# Patient Record
(demographics unavailable — no encounter records)

---

## 2024-11-05 NOTE — HISTORY OF PRESENT ILLNESS
[FreeTextEntry1] : The patient is an 84-year-old left-handed woman who was diagnosed with Parkinson's disease by Dr. Otoniel Gacria in 2019 or thereabouts.  She comes accompanied by her daughter. Her initial symptoms were tremors of both hands.  After being diagnosed with essential tremor this was later changed to Parkinson disease and she was started on levodopa.  She now takes 25/100, 1 tablet 4 times a day (8 AM, 12 PM, 4 PM, and 8 PM).  She has no clear fluctuations.  Her voice is lower but her facial expression is good.  She has rare drooling and no dysphagia.  She does have trouble turning over in bed.  Her handwriting is poor.  She is independent in ADLs but slower.  She has been using a walker since early 2023.  She does have freezing of gait especially in small spaces.  She has fallen but not recently.  All the falls have been backwards.  She still does have tremor, worse on the right.  Her memory is poor.  She does have depression for which she is on Lexapro 10 mg, which is not sufficiently helping.  She has no hallucinations.  She has no known history of REM sleep behavior disorder.  She has no clear orthostasis but does have low blood pressure.  She has no constipation.  She does have urinary urgency.  Her brother was recently diagnosed with Parkinson's disease at age 79.  In addition to the PD symptoms he also has significant numbness in her hands and feet.  She had a spine MRI at a private practice, but I do not have the results.

## 2024-11-05 NOTE — PHYSICAL EXAM
[Person] : oriented to person [Place] : oriented to place [Time] : oriented to time [Short Term Intact] : short term memory intact [Registration Intact] : recent registration memory intact [Naming Objects] : no difficulty naming common objects [Writing A Sentence] : no difficulty writing a sentence [Fluency] : fluency intact [Reading] : reading intact [Cranial Nerves Optic (II)] : visual acuity intact bilaterally,  visual fields full to confrontation, pupils equal round and reactive to light [Cranial Nerves Oculomotor (III)] : extraocular motion intact [Cranial Nerves Trigeminal (V)] : facial sensation intact symmetrically [Cranial Nerves Facial (VII)] : face symmetrical [Cranial Nerves Vestibulocochlear (VIII)] : hearing was intact bilaterally [Cranial Nerves Glossopharyngeal (IX)] : tongue and palate midline [Cranial Nerves Accessory (XI - Cranial And Spinal)] : head turning and shoulder shrug symmetric [Motor Handedness Left-Handed] : the patient is left hand dominant [1+] : Patella left 1+ [FreeTextEntry1] : Facial expression was minimally reduced but voice was normal.  Extraocular movements were intact with normal saccades and no square wave jerks seen.  Tone was increased at the right upper and lower extremity, especially with activation.  Shoulder shrug was symmetric.  Finger tap, hand , and alternating movements were slowed on the right more than left.  Foot tapping and toe tap were also slowed, worse on the right. She needed her arms to get up from the chair.  She had significant freezing of gait when walking or turns.  Pull test was positive. Throughout the exam she had an intermittent parkinsonian rest tremor of the right hand.  There were no other rest tremors and no action tremors.

## 2024-11-05 NOTE — DISCUSSION/SUMMARY
[FreeTextEntry1] : In summary, the patient is an 84-year-old left-handed woman with a diagnosis of Parkinson's disease.  She also complains of numbness in her feet and hands as well as memory issues. The neurologic examination was significant for right greater than left rigidity and bradykinesia with a right-sided parkinsonian rest tremor as well as significant freezing of gait and retropulsion. Overall, I do think the history and examination is most consistent with idiopathic Parkinson's disease.  There were no findings that would point to an atypical parkinsonian disorder.  She is already on levodopa and we discussed that this is not likely to help freezing and balance at higher doses.  However, I did advise a cautious and slow increase, beginning with just a morning dose and increasing it by half a tablet.  If she gets more lightheaded or has any other symptoms, she can always decrease it again. As far as depression, she is on Lexapro which we we will increase to 20 mg. As for her memory and numbness I recommended that some blood tests for neuropathy and memory be checked.  I did provide her with the prescriptions. She is already getting physical therapy, which I encouraged her to continue.  I spent approximately 60 minutes with the patient, examining and discussing the above findings impression.  Follow-up will be in 6 months, sooner if new problems arise.

## 2025-04-10 NOTE — HISTORY OF PRESENT ILLNESS
[FreeTextEntry1] : Patient presents today with a walker. She complains of a right fibular ingrown nail that she cannot care for herself. It is inflamed, sensitive and painful.

## 2025-04-10 NOTE — ASSESSMENT
[FreeTextEntry1] : Impression: Ingrown nail right hallux, fibular border. Pain in toe.  Treatment:  I discussed a partial nail plate avulsion. It is very sensitive. She consented. The patient was placed in the supine position where a digital block of 1% Xylocaine was administered to the right hallux consisting of 1.5cc, after prepping it with alcohol and using Ethyl Chloride.   A debridement tray was opened. The right hallux was then prepped and draped in the usual sterile manner.  Under strict sterile technique, the fibular border was freed from the surrounding nail folds.  Then utilizing an English anvil nail splitter the nail border was removed straight back to and under the eponychium.  The remaining nail bed was inspected and noted to be free of any spicules. It bled well.  Area was irrigated and cleansed.  Antibiotic gauze dressing was applied that she will leave on for 2 days. Patient was given verbal and written soaking post-operative instructions.  I expect this heals uneventfully. Patient tolerated the procedure and the anesthesia well with no apparent complications.  The patient left the office with vital signs stable and vascular status intact.  Patient is to call with any issues that persist.

## 2025-04-10 NOTE — PHYSICAL EXAM
[1+] : left foot dorsalis pedis 1+ [Sensation] : the sensory exam was normal to light touch and pinprick [No Focal Deficits] : no focal deficits [Deep Tendon Reflexes (DTR)] : deep tendon reflexes were 2+ and symmetric [Motor Exam] : the motor exam was normal [Delayed in the Right Toes] : capillary refills normal in right toes [Delayed in the Left Toes] : capillary refills normal in the left toes [de-identified] : Semi-rigid arthritic hammertoes with contracture. [FreeTextEntry1] : Ingrown nail right hallux fibular border. There is erythema and swelling along the nail fold. It is incurvated and painful but no kayla pus or paronychia.

## 2025-04-10 NOTE — PHYSICAL EXAM
[1+] : left foot dorsalis pedis 1+ [Sensation] : the sensory exam was normal to light touch and pinprick [No Focal Deficits] : no focal deficits [Deep Tendon Reflexes (DTR)] : deep tendon reflexes were 2+ and symmetric [Motor Exam] : the motor exam was normal [Delayed in the Right Toes] : capillary refills normal in right toes [Delayed in the Left Toes] : capillary refills normal in the left toes [de-identified] : Semi-rigid arthritic hammertoes with contracture. [FreeTextEntry1] : Ingrown nail right hallux fibular border. There is erythema and swelling along the nail fold. It is incurvated and painful but no kayla pus or paronychia.

## 2025-04-10 NOTE — PHYSICAL EXAM
[1+] : left foot dorsalis pedis 1+ [Sensation] : the sensory exam was normal to light touch and pinprick [No Focal Deficits] : no focal deficits [Deep Tendon Reflexes (DTR)] : deep tendon reflexes were 2+ and symmetric [Motor Exam] : the motor exam was normal [Delayed in the Right Toes] : capillary refills normal in right toes [Delayed in the Left Toes] : capillary refills normal in the left toes [de-identified] : Semi-rigid arthritic hammertoes with contracture. [FreeTextEntry1] : Ingrown nail right hallux fibular border. There is erythema and swelling along the nail fold. It is incurvated and painful but no kayla pus or paronychia.

## 2025-05-16 NOTE — HISTORY OF PRESENT ILLNESS
[FreeTextEntry1] : Follow up 5/16/25 85-year-old left-handed woman who was diagnosed with Parkinson's disease by Dr. Otoniel Garcia in 2019 or thereabouts.  She comes accompanied by her daughter. Her initial symptoms were tremors of both hands.  After being diagnosed with essential tremor this was later changed to Parkinson disease and she was started on levodopa.  She was previously taking 25/100, 1 tablet 4 times a day (8 AM, 12 PM, 4 PM, and 8 PM).  Since her prior visit she has increased the dosage to 25/100, 1.5 tablet 4 times a day (8 AM, 12 PM, 4 PM, and 8 PM). Previously she was having episodes where she would freeze and be unable to walk a few times a week at most, now since increasing the Sinemet to 1.5 tablets 4x per day, she reports improvements in her freezing spells. No fluctuations in symptoms. She is tolerating the Sinemet 1.5 tablets 4x per day without symptoms.  Patient reports her voice has become softer, her friends often ask her to repeat herself. No dysphagia, no drooling. Patient sleeps about a total 6 - 8 hours of sleep though it is broken up as she needs to use the restroom at night. She is independent in ADLs but slower.  She requires help cooking. She has been using a walker since early 2023.  She does have freezing of gait especially in small spaces.  No recent falls.  She still does have tremor, worse on the right.  Her memory is poor.  She does have depression for which she is on Lexapro 10 mg, which is not sufficiently helping.  Patient reports feeling sad/ depressed, she is unsure if the Lexapro is helping. She also reports significant memory decline.She has no hallucinations.  She has no known history of REM sleep behavior disorder.  She has no clear orthostasis but does have low blood pressure.  She has no constipation.  She does have urinary urgency.  Her brother was recently diagnosed with Parkinson's disease at age 79.   In addition to the PD symptoms he also has significant numbness in her hands and feet.  She had a spine MRI at a private practice, but I do not have the results.

## 2025-05-16 NOTE — DISCUSSION/SUMMARY
[FreeTextEntry1] : 85-year-old left-handed woman with a diagnosis of Parkinson's disease.  She also complains of numbness in her feet and hands as well as memory issues. Her freezing spells have improved since increasing Sinemet to 1.5 tablets 8 AM 12 PM 4 PM 8 PM. The neurologic examination was significant for right greater than left rigidity and bradykinesia with a right-sided parkinsonian rest tremor as well as significant freezing of gait.  Impression: Right greater than left rigidity and bradykinesia with a right-sided parkinsonian rest tremor as well as significant freezing of gait. Most consistent with idiopathic Parkinson's disease.   [] Increase Sinemet to 2 tablets timed for 8 AM, 12 PM, 4 PM and 8 PM [] Continue PT/ OT [] Continue Lexapro 20 mg daily [] Follow-up will be in 6 months, sooner if new problems arise.  We discussed the above impression, plan and recommendations during the visit. Counseling represented more then 50% of the 30 minute visit time.  Case discussed with Dr. Bain.

## 2025-05-16 NOTE — PHYSICAL EXAM
[Person] : oriented to person [Place] : oriented to place [Time] : oriented to time [Short Term Intact] : short term memory intact [Registration Intact] : recent registration memory intact [Naming Objects] : no difficulty naming common objects [Writing A Sentence] : no difficulty writing a sentence [Fluency] : fluency intact [Reading] : reading intact [Cranial Nerves Optic (II)] : visual acuity intact bilaterally,  visual fields full to confrontation, pupils equal round and reactive to light [Cranial Nerves Oculomotor (III)] : extraocular motion intact [Cranial Nerves Trigeminal (V)] : facial sensation intact symmetrically [Cranial Nerves Facial (VII)] : face symmetrical [Cranial Nerves Vestibulocochlear (VIII)] : hearing was intact bilaterally [Cranial Nerves Glossopharyngeal (IX)] : tongue and palate midline [Cranial Nerves Accessory (XI - Cranial And Spinal)] : head turning and shoulder shrug symmetric [Cranial Nerves Hypoglossal (XII)] : there was no tongue deviation with protrusion [Motor Handedness Left-Handed] : the patient is left hand dominant [1+] : Patella left 1+ [FreeTextEntry1] : Facial expression was minimally reduced but voice was normal.  Extraocular movements were intact with normal saccades and no square wave jerks seen.  Tone was increased at the right upper and lower extremity, especially with activation.   Finger tap, hand , and alternating movements were slowed on the right more than left. She needed her arms to get up from the chair.  She had significant freezing of gait when walking or turns.  She was unable to lift her feet from the ground with gait testing. Throughout the exam she had an intermittent parkinsonian rest tremor of the right hand.  There were no other rest tremors and no action tremors.